# Patient Record
Sex: MALE | Race: WHITE | Employment: FULL TIME | URBAN - METROPOLITAN AREA
[De-identification: names, ages, dates, MRNs, and addresses within clinical notes are randomized per-mention and may not be internally consistent; named-entity substitution may affect disease eponyms.]

---

## 2017-01-13 PROBLEM — R07.9 CHEST PAIN: Status: ACTIVE | Noted: 2017-01-13

## 2017-01-13 PROBLEM — I10 ESSENTIAL HYPERTENSION WITH GOAL BLOOD PRESSURE LESS THAN 130/85: Status: ACTIVE | Noted: 2017-01-13

## 2017-02-23 ENCOUNTER — HOSPITAL ENCOUNTER (EMERGENCY)
Age: 42
Discharge: STILL ADMITTED OR ADMINISTRATIVE | End: 2017-02-23
Attending: EMERGENCY MEDICINE
Payer: COMMERCIAL

## 2017-02-23 VITALS
BODY MASS INDEX: 31.81 KG/M2 | TEMPERATURE: 98.6 F | HEIGHT: 73 IN | WEIGHT: 240 LBS | RESPIRATION RATE: 16 BRPM | DIASTOLIC BLOOD PRESSURE: 105 MMHG | HEART RATE: 84 BPM | SYSTOLIC BLOOD PRESSURE: 150 MMHG | OXYGEN SATURATION: 97 %

## 2017-02-23 PROBLEM — L02.01 ABSCESS OF FACE: Status: ACTIVE | Noted: 2017-02-23

## 2017-02-23 PROCEDURE — 75810000275 HC EMERGENCY DEPT VISIT NO LEVEL OF CARE: Performed by: EMERGENCY MEDICINE

## 2017-02-23 NOTE — ED TRIAGE NOTES
Reports has abscess to right jaw. States sent from PCP to [facility] to here to see [facility].

## 2017-06-08 PROBLEM — L02.01 ABSCESS OF FACE: Status: RESOLVED | Noted: 2017-02-23 | Resolved: 2017-06-08

## 2017-06-08 PROBLEM — I10 ESSENTIAL HYPERTENSION WITH GOAL BLOOD PRESSURE LESS THAN 130/85: Status: RESOLVED | Noted: 2017-01-13 | Resolved: 2017-06-08

## 2018-04-05 PROBLEM — J30.9 ALLERGIC RHINITIS: Status: ACTIVE | Noted: 2018-04-05

## 2018-07-05 PROBLEM — F32.9 REACTIVE DEPRESSION: Status: ACTIVE | Noted: 2018-07-05
